# Patient Record
Sex: FEMALE | Race: WHITE | NOT HISPANIC OR LATINO | Employment: OTHER | URBAN - NONMETROPOLITAN AREA
[De-identification: names, ages, dates, MRNs, and addresses within clinical notes are randomized per-mention and may not be internally consistent; named-entity substitution may affect disease eponyms.]

---

## 2022-04-08 ENCOUNTER — TRANSCRIBE ORDERS (OUTPATIENT)
Dept: PHYSICAL THERAPY | Facility: HOSPITAL | Age: 82
End: 2022-04-08

## 2022-04-08 DIAGNOSIS — H81.12 BENIGN PAROXYSMAL VERTIGO OF LEFT EAR: Primary | ICD-10-CM

## 2022-04-14 ENCOUNTER — HOSPITAL ENCOUNTER (OUTPATIENT)
Dept: PHYSICAL THERAPY | Facility: HOSPITAL | Age: 82
Setting detail: THERAPIES SERIES
Discharge: HOME | End: 2022-04-14

## 2022-04-14 DIAGNOSIS — R42 VERTIGO: Primary | ICD-10-CM

## 2022-04-14 DIAGNOSIS — R42 DIZZINESS: ICD-10-CM

## 2022-04-14 PROCEDURE — 97110 THERAPEUTIC EXERCISES: CPT | Performed by: PHYSICAL THERAPIST

## 2022-04-14 PROCEDURE — 97162 PT EVAL MOD COMPLEX 30 MIN: CPT | Performed by: PHYSICAL THERAPIST

## 2022-04-14 NOTE — THERAPY EVALUATION
Outpatient Physical Therapy Vestibular Initial Evaluation  [facility]     Patient Name: Fatemeh Ware  : 1940  MRN: [id number]  Today's Date: 2022      Visit Date: 2022    Patient seen for 1 PT sessions.  Patient reports N/A% of improvement.  Next MD appt: 2022.  Recertification: 2022.    Therapy Diagnosis: Dizziness         Past Medical History:   Diagnosis Date   • Anxiety    • Arthritis    • Elevated cholesterol    • History of transfusion    • Hypertension    • Sleep apnea         Past Surgical History:   Procedure Laterality Date   • EYE SURGERY     • HYSTERECTOMY     • JOINT REPLACEMENT     • TONSILLECTOMY           Visit Dx:     ICD-10-CM ICD-9-CM   1. Vertigo  R42 780.4   2. Dizziness  R42 780.4        Patient History     Row Name 22             History    Chief Complaint Dizziness  -AJ      Date Current Problem(s) Began --  Chronic, 6 months  -AJ      Brief Description of Current Complaint Patient reports she has had issues with ear infections and some dizziness. She reprots she has not been seen by an ENT. She reprots PT is the first place they have sent her.  -AJ      Previous treatment for THIS PROBLEM Medication  -AJ      Current Tobacco Use None  -AJ      Smoking Status Former smoker  -AJ      Patient's Rating of General Health Fair  -AJ      Hand Dominance right-handed  -AJ      Occupation/sports/leisure activities Occupation: unemployed; Hobbies: sewing, knitting, cross stitch, cooking, travel  -AJ      Patient seeing anyone else for problem(s)? PCP  -AJ      What clinical tests have you had for this problem? --  None  -AJ              Pain     Pain at Present 0  -AJ      Pain at Best 0  -AJ      Pain at Worst 0  -AJ            User Key  (r) = Recorded By, (t) = Taken By, (c) = Cosigned By    Initials Name Provider Type    Carline Day, PT DPT Physical Therapist                 Vestibular Rohit     Row Name 22 0800              Occulomotor Exam Fixation Present    Occular ROM Normal  -AJ      Spontaneous Nystagmus Absent  -AJ      Gaze-induced Nystagmus Absent  -AJ      Head Shaking Horizontal Absent  -AJ      Head Shaking Vertical Absent  -AJ      Smooth Pursuit Normal  -AJ      Convergence Normal  -              Vestibulo-Occular Reflex (VOR)    VOR 1 Head Only Normal  -AJ      VOR to Slow Head Movement Normal  -AJ      VOR to Fast Head Movement/Head Thrust Test Normal  -AJ              VOR with Occulomotor Exam Fixation Absent     Spontaneous Nystagmus Absent  -AJ      Gaze-Induced Nystagmus Absent  -AJ      Head-Shaking Nystagmus Horizontal Absent  -AJ      Head-Shaking Nystagmus Vertical Absent  -AJ              Positional Testing    Vertebrobasilar Artery Screen - Right Negative  -AJ      Vertebrobasilar Artery Screen - Left Negative  -AJ      Marshall-Hallpike Right No nystagmus  -AJ      Kim-Hallpike Left No nystagmus  -AJ      Horizontal Roll Test Right No nystagmus  -      Horizontal Roll Test Left No nystagmus  -            User Key  (r) = Recorded By, (t) = Taken By, (c) = Cosigned By    Initials Name Provider Type    AJ Carline Garcias, PT DPT Physical Therapist               PT Ortho     Row Name 04/14/22 0800       Subjective Comments    Subjective Comments see history  -       Precautions and Contraindications    Precautions/Limitations no known precautions/limitations  -       Subjective Pain    Able to rate subjective pain? yes  -AJ    Pre-Treatment Pain Level 0  -    Post-Treatment Pain Level 0  -       Posture/Observations    Alignment Options Forward head;Cervical lordosis;Thoracic kyphosis  -AJ    Forward Head Mild;Moderate;Increased  -AJ    Cervical Lordosis Mild;Moderate;Increased  -AJ    Thoracic Kyphosis Mild;Moderate;Increased  -    Posture/Observations Comments No distress, poor overall postural awareness.  -AJ       Cervical/Thoracic Special Tests    Spurlings (Foraminal Compression)  Bilateral:;Negative  -AJ    Cervical Compression (Forarminal Compression vs. Facet Pain) Bilateral:;Negative  -AJ    Cervical Distraction (Foraminal Compression vs. Facet Pain) Bilateral:;Negative  -AJ    Vertebral Artery Test (VBI Sign) Bilateral:;Negative  -AJ       Head/Neck/Trunk    Neck Extension AROM 45°  -AJ    Neck Flexion AROM 42°  -AJ    Neck Lt Lateral Flexion AROM 25°  -AJ    Neck Rt Lateral Flexion AROM 25°  -AJ    Neck Lt Rotation AROM 50°  -AJ    Neck Rt Rotation AROM 62°  -AJ       MMT (Manual Muscle Testing)    General MMT Comments WFL c-spine and B UEs  -AJ       Sensation    Sensation WNL? WNL  -AJ    Light Touch No apparent deficits  -AJ    Additional Comments Denies any numbness or tingling.  -AJ       Pathomechanics    Spine Pathomechanics Limited upper thoracic motion with cervical ROM  -       Transfers    Comment, (Transfers) I with all transafers.  -       Gait/Stairs (Locomotion)    Comment, (Gait/Stairs) FWB, non-antalgic gait, no assistive device, no significant deviations noted, normal arm swing with gait.  -          User Key  (r) = Recorded By, (t) = Taken By, (c) = Cosigned By    Initials Name Provider Type    Carline Day, PT DPT Physical Therapist                          Therapy Education  Education Details: HEP: all exercises given today  Given: HEP, Symptoms/condition management, Fall prevention and home safety (POC)  Program: New  How Provided: Verbal, Demonstration, Written  Provided to: Patient  Level of Understanding: Verbalized, Demonstrated       OP Exercises     Row Name 04/14/22 0800             Subjective Comments    Subjective Comments see history  -              Subjective Pain    Able to rate subjective pain? yes  -      Pre-Treatment Pain Level 0  -      Post-Treatment Pain Level 0  -AJ              Exercise 1    Exercise Name 1 Pro II UE F/R- eyes scanning the room  -      Time 1 8 minutes  -      Additional Comments L 2.0  -AJ               Exercise 2    Exercise Name 2 B UT S  -AJ      Reps 2 2  -AJ      Time 2 30 seconds  -AJ              Exercise 3    Exercise Name 3 B LS S  -AJ      Reps 3 2  -AJ      Time 3 30 seconds  -AJ              Exercise 4    Exercise Name 4 AROM c-spine 6-way with eyes fixed on stationary object  -      Reps 4 10 each  -              Exercise 5    Exercise Name 5 B SCM S  -AJ      Reps 5 2  -AJ      Time 5 30 seconds  -            User Key  (r) = Recorded By, (t) = Taken By, (c) = Cosigned By    Initials Name Provider Type    Carline Day, PT DPT Physical Therapist                             PT OP Goals     Row Name 04/14/22 0800          PT Short Term Goals    STG 1 I with HEP and have additions/changes by next re-certification  -     STG 2 Patient able to roll from side to side with no onset of s/s.  -     STG 3 Patient able to perform 10 sit to/from stand with exaggerate head motions with no onset of s/s.  -     STG 4 AROM B cervical SB >= 30°.  -     STG 5 AROM B cervical ROT >=70°.  -            Long Term Goals    LTG 1 AROM for c-spine all WNL< no increase in s/s.  -     LTG 2 Patient to report s/s have resolved completely.  -     LTG 3 Patient to be I with self-management of s/s.  -     LTG 4 I with final HEP.  -            Time Calculation    PT Goal Re-Cert Due Date 05/05/22  -           User Key  (r) = Recorded By, (t) = Taken By, (c) = Cosigned By    Initials Name Provider Type    Carline Day, PT DPT Physical Therapist              Barriers to Rehab: Include significant or possible arthritic/degenerative changes that have occurred within the joint/spine, The chronicity of this issue.    Safety Issues: None noted.        PT Assessment/Plan     Row Name 04/14/22 0800          PT Assessment    Functional Limitations Performance in leisure activities;Limitation in home management  -AALIYAH     Impairments Joint integrity;Joint mobility;Posture;Range of motion;Balance  " -AJ     Assessment Comments Patient is an 82yo female with complaints of dizziness and \"junk in her R ear\". She reports dizziness is random and also has \"funny feeling in the head\". No falls but lives alone. She has limitatoins in cervical ROM and flexibiliyt, but all vestibular tests are negative. We will work on comnpensation techniques and coping mechanisms for dizziness as could be from ear drainage or side effects from medications. We will also address flexibility and mobiltiy as well as balance. Skilled PT with address deficits listed.    Patient did well with all HEP exercises and written copies were provided to the patient.  -AJ     Please refer to paper survey for additional self-reported information Yes  -AJ     Rehab Potential Good  -AJ     Patient/caregiver participated in establishment of treatment plan and goals Yes  -AJ     Patient would benefit from skilled therapy intervention Yes  -AJ            PT Plan    PT Frequency 2x/week  -AJ     Predicted Duration of Therapy Intervention (PT) 4-8 visits  -AJ     Planned CPT's? PT EVAL MOD COMPLELITY: 94474;PT RE-EVAL: 86259;PT THER PROC EA 15 MIN: 76360;PT THER ACT EA 15 MIN: 57883;PT MANUAL THERAPY EA 15 MIN: 82486;PT NEUROMUSC RE-EDUCATION EA 15 MIN: 99186;PT GAIT TRAINING EA 15 MIN: 74942;PT SELF CARE/HOME MGMT/TRAIN EA 15: 92080;PT CANALITH REPOSITIONIN;PT THER SUPP EA 15 MIN  -AJ     PT Plan Comments Progress overall ROM, flexibility, safety, balance, and s/s management.  -AAILYAH           User Key  (r) = Recorded By, (t) = Taken By, (c) = Cosigned By    Initials Name Provider Type    Carline Day, PT DPT Physical Therapist             Other therapeutic activities and/or exercises will be prescribed depending on the patient's progress or lack thereof.      Outcome Measure Options: Dizziness Handicap Inventory (Physical: 0, Emotinal :12, Functional: 8; TOTAL 20, mild)         Time Calculation:   Start Time: 819  Stop Time: 915  Time " Calculation (min): 56 min  Total Timed Code Minutes- PT: 26 minute(s)   Therapy Charges for Today     Code Description Service Date Service Provider Modifiers Qty    43586356372 HC PT EVAL MOD COMPLEXITY 2 4/14/2022 Carline Garcias, PT DPT GP 1    66143494044 HC PT THER SUPP EA 15 MIN 4/14/2022 Carline Garcias, PT DPT GP 1    71100165013 HC PT THER PROC EA 15 MIN 4/14/2022 Carline Garcias, PT DPT GP 2          PT G-Codes  Outcome Measure Options: Dizziness Handicap Inventory (Physical: 0, Emotinal :12, Functional: 8; TOTAL 20, mild)         This document has been electronically signed by Carline Garcias, PT DPT, CSCS on April 14, 2022 10:39 CDT

## 2022-04-18 ENCOUNTER — HOSPITAL ENCOUNTER (OUTPATIENT)
Dept: PHYSICAL THERAPY | Facility: HOSPITAL | Age: 82
Setting detail: THERAPIES SERIES
Discharge: HOME | End: 2022-04-18

## 2022-04-18 DIAGNOSIS — R42 VERTIGO: Primary | ICD-10-CM

## 2022-04-18 DIAGNOSIS — R42 DIZZINESS: ICD-10-CM

## 2022-04-18 PROCEDURE — 97112 NEUROMUSCULAR REEDUCATION: CPT

## 2022-04-18 NOTE — THERAPY TREATMENT NOTE
Outpatient Physical Therapy Ortho Treatment Note  [facility]     Patient Name: Fatemeh Ware  : 1940  MRN: [id number]  Today's Date: 2022      Visit Date: 2022     Patient has attended 2 visits  N/A% Improvement  MD Visit:   Recheck Date: 2022    Therapy Diagnosis: Dizziness      Visit Dx:    ICD-10-CM ICD-9-CM   1. Vertigo  R42 780.4   2. Dizziness  R42 780.4       There is no problem list on file for this patient.       Past Medical History:   Diagnosis Date   • Anxiety    • Arthritis    • Elevated cholesterol    • History of transfusion    • Hypertension    • Sleep apnea         Past Surgical History:   Procedure Laterality Date   • EYE SURGERY     • HYSTERECTOMY     • JOINT REPLACEMENT     • TONSILLECTOMY          PT Ortho     Row Name 22 1500       Precautions and Contraindications    Precautions/Limitations no known precautions/limitations  -       Subjective Pain    Pre-Treatment Pain Level 0  denies pain and dizziness currently  -    Post-Treatment Pain Level 0  no dizziness, reports only her normal arthritic stuff as far as pain goes  -          User Key  (r) = Recorded By, (t) = Taken By, (c) = Cosigned By    Initials Name Provider Type    Jody Ledesma, PTA Physical Therapist Assistant                             PT Assessment/Plan     Row Name 22 1500          PT Assessment    Assessment Comments patient requires max cueing to complete all therex appropriately today. Discussed the need to slow transitions down (sit to stand to walking) and pause between each in order to be safer and reduce symptoms. the only time symptoms are illicited today was during a sit to stand transfer. no other symptoms noted during treatment and patient reports feeling better before she leaves.  -            PT Plan    PT Frequency 2x/week  -     PT Plan Comments sit to/from stand with head movement  -           User Key  (r) = Recorded By, (t) = Taken By, (c) =  Cosigned By    Initials Name Provider Type     Jody Lopez, PRAVEEN Physical Therapist Assistant                   OP Exercises     Row Name 04/18/22 1500             Subjective Comments    Subjective Comments states that she is doing her exercises and that they make her dizzy. states that they all make her dizzy vs give her relieve of symptoms. states that she has to try therapy. as treatment goes on, she goes on to say that saturday she even got dizzy even when she went to get a massage saturday. states that she was so dizzy after her massage that she didn't even do her exercises on saturday. post Pro II bike today patient is asked if that made her dizzy and patient states that she wont know until she stands up.  -              Subjective Pain    Able to rate subjective pain? yes  -      Pre-Treatment Pain Level 0  denies pain and dizziness currently  -      Post-Treatment Pain Level 0  no dizziness, reports only her normal arthritic stuff as far as pain goes  -              Exercise 1    Exercise Name 1 Pro II UE Fwd/Retro Fixed Gaze Cspine Flex/Ext and Cspine Rotation and No Cspine ROM alt through  -      Cueing 1 Verbal;Demo  -      Time 1 10 minutes  -      Additional Comments L 4.0  -              Exercise 2    Exercise Name 2 B UT S  -      Cueing 2 Verbal;Demo  -      Reps 2 2  -      Time 2 30 sec hold  -              Exercise 3    Exercise Name 3 B Levator S  -      Cueing 3 Verbal;Demo  -      Reps 3 2  -      Time 3 30 sec hold  -              Exercise 4    Exercise Name 4 B SCM S  -      Cueing 4 Verbal;Tactile;Demo  -      Reps 4 2  -      Time 4 30 sec hold  -              Exercise 5    Exercise Name 5 AROM Cspine 6 way with Fixed Gaze  -      Cueing 5 Verbal;Demo  -      Reps 5 15 each  -              Exercise 6    Exercise Name 6 sit to stand to walk to sit  -            User Key  (r) = Recorded By, (t) = Taken By, (c) = Cosigned By    Initials Name  Provider Type     Jody Lopez PTA Physical Therapist Assistant                              PT OP Goals     Row Name 04/18/22 1500          PT Short Term Goals    STG 1 I with HEP and have additions/changes by next re-certification  -     STG 1 Progress Partially Met  -     STG 2 Patient able to roll from side to side with no onset of s/s.  -     STG 3 Patient able to perform 10 sit to/from stand with exaggerate head motions with no onset of s/s.  -     STG 4 AROM B cervical SB >= 30°.  -     STG 5 AROM B cervical ROT >=70°.  -            Long Term Goals    LTG 1 AROM for c-spine all WNL< no increase in s/s.  -     LTG 2 Patient to report s/s have resolved completely.  -     LTG 3 Patient to be I with self-management of s/s.  -     LTG 4 I with final HEP.  -            Time Calculation    PT Goal Re-Cert Due Date 05/05/22  -           User Key  (r) = Recorded By, (t) = Taken By, (c) = Cosigned By    Initials Name Provider Type     Jody Lopze PTA Physical Therapist Assistant                Therapy Education  Given: HEP, Symptoms/condition management, Fall prevention and home safety  Program: Reinforced  How Provided: Verbal, Demonstration  Provided to: Patient  Level of Understanding: Teach back education performed, Verbalized, Demonstrated              Time Calculation:   Start Time: 1513  Stop Time: 1606  Time Calculation (min): 53 min  Total Timed Code Minutes- PT: 53 minute(s)  Therapy Charges for Today     Code Description Service Date Service Provider Modifiers Qty    75485406885 HC PT NEUROMUSC RE EDUCATION EA 15 MIN 4/18/2022 Jody Lopez PTA GP, CQ 4                    Jody Lopez PTA  4/18/2022       This document has been electronically signed by Jody Lopez PTA on April 18, 2022 16:15 CDT

## 2022-04-20 ENCOUNTER — HOSPITAL ENCOUNTER (OUTPATIENT)
Dept: PHYSICAL THERAPY | Facility: HOSPITAL | Age: 82
Setting detail: THERAPIES SERIES
Discharge: HOME | End: 2022-04-20

## 2022-04-20 DIAGNOSIS — R42 VERTIGO: Primary | ICD-10-CM

## 2022-04-20 DIAGNOSIS — R42 DIZZINESS: ICD-10-CM

## 2022-04-20 PROCEDURE — 97112 NEUROMUSCULAR REEDUCATION: CPT | Performed by: PHYSICAL THERAPIST

## 2022-04-20 PROCEDURE — 97110 THERAPEUTIC EXERCISES: CPT | Performed by: PHYSICAL THERAPIST

## 2022-04-20 NOTE — THERAPY TREATMENT NOTE
Outpatient Physical Therapy Vestibular Treatment Note  [facility]     Patient Name: Fatemeh Ware  : 1940  MRN: [id number]  Today's Date: 2022      Visit Date: 2022    Patient seen for 3 PT sessions.  Patient reports 0% of improvement.  Next MD appt: 2022.  Recertification: 2022.    Therapy Diagnosis: Dizziness        Visit Dx:     ICD-10-CM ICD-9-CM   1. Vertigo  R42 780.4   2. Dizziness  R42 780.4            PT Ortho     Row Name 22 09       Subjective Comments    Subjective Comments Patient continues to report she has dizziness on/off with no known aggrivating factors. She reports she still has trouble remembering to go slow also.  -AALIYAH       Precautions and Contraindications    Precautions/Limitations no known precautions/limitations  -AALIYAH          User Key  (r) = Recorded By, (t) = Taken By, (c) = Cosigned By    Initials Name Provider Type    Carline Day, PT DPT Physical Therapist                           PT Assessment/Plan     Row Name 22          PT Assessment    Assessment Comments Patient did well with new ther ex. Able to complete al lther ex today with no complaints of dizziness and no onset of s/s.  -AALIYAH            PT Plan    PT Frequency 2x/week  -     PT Plan Comments Add rolling next session.  -           User Key  (r) = Recorded By, (t) = Taken By, (c) = Cosigned By    Initials Name Provider Type    Carline Day, PT DPT Physical Therapist                    OP Exercises     Row Name 22             Subjective Comments    Subjective Comments Patient continues to report she has dizziness on/off with no known aggrivating factors. She reports she still has trouble remembering to go slow also.  -AALIYAH              Subjective Pain    Able to rate subjective pain? yes  -AALIYAH      Pre-Treatment Pain Level 0  -AALIYAH              Exercise 1    Exercise Name 1 Pro II UE Fwd/Retro Fixed Gaze Cspine Flex/Ext and Cspine Rotation  "and No Cspine ROM alt through  -AJ      Cueing 1 Verbal;Demo  -AJ      Time 1 10 minutes  -AJ      Additional Comments L 4.0  -AJ              Exercise 2    Exercise Name 2 B UT S  -AJ      Reps 2 2  -AJ      Time 2 30 sec hold  -AJ              Exercise 3    Exercise Name 3 B Levator S  -AJ      Reps 3 2  -AJ      Time 3 30 sec hold  -AJ              Exercise 4    Exercise Name 4 B SCM S  -AJ      Reps 4 2  -AJ      Time 4 30 sec hold  -AJ              Exercise 5    Exercise Name 5 sit to/from stand for exaggerated head motions  -AJ      Reps 5 10  -AJ              Exercise 6    Exercise Name 6 AROM Cspine 6 way with Fixed Gaze  -AJ      Reps 6 15 each  -AJ              Exercise 7    Exercise Name 7 AROM c-spine wandering gaze  -AJ      Reps 7 15 each  -AJ              Exercise 8    Exercise Name 8 Scap squeezes  -AJ      Reps 8 20  -AJ      Time 8 5\" hold  -AJ            User Key  (r) = Recorded By, (t) = Taken By, (c) = Cosigned By    Initials Name Provider Type    Carline Day, PT DPT Physical Therapist                             PT OP Goals     Row Name 04/20/22 0900          PT Short Term Goals    STG 1 I with HEP and have additions/changes by next re-certification  -     STG 1 Progress Partially Met  -     STG 2 Patient able to roll from side to side with no onset of s/s.  -     STG 3 Patient able to perform 10 sit to/from stand with exaggerate head motions with no onset of s/s.  -     STG 3 Progress Met;Ongoing  -     STG 4 AROM B cervical SB >= 30°.  -     STG 5 AROM B cervical ROT >=70°.  -            Long Term Goals    LTG 1 AROM for c-spine all WNL< no increase in s/s.  -     LTG 2 Patient to report s/s have resolved completely.  -     LTG 3 Patient to be I with self-management of s/s.  -     LTG 4 I with final HEP.  -            Time Calculation    PT Goal Re-Cert Due Date 05/05/22  -           User Key  (r) = Recorded By, (t) = Taken By, (c) = Cosigned By    " Initials Name Provider Type     Carline Garcias, PT DPT Physical Therapist                Therapy Education  Given: HEP, Symptoms/condition management, Fall prevention and home safety  Program: Reinforced  How Provided: Verbal, Demonstration  Provided to: Patient  Level of Understanding: Teach back education performed, Verbalized, Demonstrated              Time Calculation:   Start Time: 0910  Stop Time: 0949  Time Calculation (min): 39 min  Total Timed Code Minutes- PT: 39 minute(s)   Therapy Charges for Today     Code Description Service Date Service Provider Modifiers Qty    14579508473 HC PT THER SUPP EA 15 MIN 4/20/2022 Carline Garcias, PT DPT GP 1    [id number]  PT NEUROMUSC RE EDUCATION EA 15 MIN 4/20/2022 Carline Garcias, PT DPT GP 2    [id number]  PT THER PROC EA 15 MIN 4/20/2022 Carline Garcias, PT DPT GP 1                 This document has been electronically signed by Carline Garcias PT DPT, [facility] on April 20, 2022 09:49 CDT

## 2022-04-25 ENCOUNTER — HOSPITAL ENCOUNTER (OUTPATIENT)
Dept: PHYSICAL THERAPY | Facility: HOSPITAL | Age: 82
Setting detail: THERAPIES SERIES
Discharge: HOME | End: 2022-04-25

## 2022-04-25 DIAGNOSIS — R42 VERTIGO: Primary | ICD-10-CM

## 2022-04-25 DIAGNOSIS — R42 DIZZINESS: ICD-10-CM

## 2022-04-25 PROCEDURE — 97110 THERAPEUTIC EXERCISES: CPT

## 2022-04-25 PROCEDURE — 97112 NEUROMUSCULAR REEDUCATION: CPT

## 2022-04-25 NOTE — THERAPY TREATMENT NOTE
"    Outpatient Physical Therapy Ortho Treatment Note  [facility]     Patient Name: Fatemeh Ware  : 1940  MRN: [id number]  Today's Date: 2022     Pt seen for 4 PT sessions  Reported Improvement:  \"a little bit\"  %  MD Visit: 2022  Recheck Date: 2022    Therapy Diagnosis:  Dizziness        Visit Date: 2022    Visit Dx:    ICD-10-CM ICD-9-CM   1. Vertigo  R42 780.4   2. Dizziness  R42 780.4       There is no problem list on file for this patient.       Past Medical History:   Diagnosis Date   • Anxiety    • Arthritis    • Elevated cholesterol    • History of transfusion    • Hypertension    • Sleep apnea         Past Surgical History:   Procedure Laterality Date   • EYE SURGERY     • HYSTERECTOMY     • JOINT REPLACEMENT     • TONSILLECTOMY          PT Ortho     Row Name 22       Subjective Comments    Subjective Comments Pt reports dizziness off and on.  No known cause or position of s/s  -JW       Precautions and Contraindications    Precautions/Limitations no known precautions/limitations  -       Subjective Pain    Able to rate subjective pain? yes  -    Pre-Treatment Pain Level 0  -          User Key  (r) = Recorded By, (t) = Taken By, (c) = Cosigned By    Initials Name Provider Type    Hamida Duran PTA Physical Therapist Assistant                             PT Assessment/Plan     Row Name 22          PT Assessment    Assessment Comments Improved carryover of HEP with UE stretches. Pt reports improved compliance with HEP in general but did not get them done yesterday.  Reports s/s with fixed gaze AROM stating it started a little before that.  Pt with occasional reports of feeling like \"drunken \" more than dizzines.  -            PT Plan    PT Frequency 2x/week  -     PT Plan Comments progress rolling  -           User Key  (r) = Recorded By, (t) = Taken By, (c) = Cosigned By    Initials Name Provider Type    Hamida Duran PTA " "Physical Therapist Assistant                   OP Exercises     Row Name 04/25/22 0900             Subjective Comments    Subjective Comments Pt reports dizziness off and on.  No known cause or position of s/s  -JW              Subjective Pain    Able to rate subjective pain? yes  -JW      Pre-Treatment Pain Level 0  -JW              Exercise 1    Exercise Name 1 Pro II UE Fwd/Retro Fixed Gaze Cspine Flex/Ext and Cspine Rotation and No Cspine ROM alt through  -JW      Time 1 10 minutes  -JW      Additional Comments L 4.0  -JW              Exercise 2    Exercise Name 2 B UT S  -JW      Reps 2 2  -JW      Time 2 30 sec hold  -JW              Exercise 3    Exercise Name 3 B Levator S  -JW      Reps 3 2  -JW      Time 3 30 sec hold  -JW              Exercise 4    Exercise Name 4 B SCM S  -JW      Reps 4 2  -JW      Time 4 30 sec hold  -JW              Exercise 5    Exercise Name 5 scap squeezes  -JW      Reps 5 20  -JW      Time 5 5\" hold  -JW              Exercise 6    Exercise Name 6 AROM Cspine 6 way with Fixed Gaze  -JW      Reps 6 20  -JW      Time 6 ea direction  -JW              Exercise 7    Exercise Name 7 AROM c-spine wandering gaze  -JW      Reps 7 20  -JW              Exercise 8    Exercise Name 8 Sidelying to sitting  -JW      Reps 8 5 reps ea side  -JW              Exercise 9    Exercise Name 9 Rolling Left  -JW      Reps 9 5 reps  -JW              Exercise 10    Exercise Name 10 Rolling right  -JW      Sets 10 5 reps  -JW              Exercise 11    Exercise Name 11 sit to stands  -JW      Sets 11 exaggerated head motions  -JW      Reps 11 10  -JW            User Key  (r) = Recorded By, (t) = Taken By, (c) = Cosigned By    Initials Name Provider Type    Hamida Duran PTA Physical Therapist Assistant                              PT OP Goals     Row Name 04/25/22 0900          PT Short Term Goals    STG 1 I with HEP and have additions/changes by next re-certification  -JW     STG 1 Progress Partially " Met  -     STG 2 Patient able to roll from side to side with no onset of s/s.  -     STG 3 Patient able to perform 10 sit to/from stand with exaggerate head motions with no onset of s/s.  -     STG 3 Progress Met;Ongoing  -     STG 4 AROM B cervical SB >= 30°.  -     STG 5 AROM B cervical ROT >=70°.  -            Long Term Goals    LTG 1 AROM for c-spine all WNL< no increase in s/s.  -     LTG 2 Patient to report s/s have resolved completely.  -     LTG 3 Patient to be I with self-management of s/s.  -     LTG 4 I with final HEP.  -            Time Calculation    PT Goal Re-Cert Due Date 05/05/22  -           User Key  (r) = Recorded By, (t) = Taken By, (c) = Cosigned By    Initials Name Provider Type    Hamida Duran PTA Physical Therapist Assistant                Therapy Education  Given: HEP, Symptoms/condition management, Fall prevention and home safety  Program: Reinforced  How Provided: Verbal, Demonstration  Provided to: Patient  Level of Understanding: Teach back education performed, Verbalized, Demonstrated              Time Calculation:   Start Time: 0915  Therapy Charges for Today     Code Description Service Date Service Provider Modifiers Qty    79989308928 HC PT THER PROC EA 15 MIN 4/25/2022 Hamida Falk PTA GP, CQ 2    45466385428 HC PT NEUROMUSC RE EDUCATION EA 15 MIN 4/25/2022 Hamida Falk PTA GP, CQ 2    68357641521 HC PT THER SUPP EA 15 MIN 4/25/2022 Hamida Falk PTA GP, CQ 1                    Hamida Falk PTA  4/25/2022

## 2022-04-27 ENCOUNTER — HOSPITAL ENCOUNTER (OUTPATIENT)
Dept: PHYSICAL THERAPY | Facility: HOSPITAL | Age: 82
Setting detail: THERAPIES SERIES
Discharge: HOME | End: 2022-04-27

## 2022-04-27 DIAGNOSIS — R42 DIZZINESS: ICD-10-CM

## 2022-04-27 DIAGNOSIS — R42 VERTIGO: Primary | ICD-10-CM

## 2022-04-27 PROCEDURE — 97110 THERAPEUTIC EXERCISES: CPT

## 2022-04-27 NOTE — THERAPY TREATMENT NOTE
Outpatient Physical Therapy Ortho Treatment Note  [facility]     Patient Name: Fatemeh Ware  : 1940  MRN: [id number]  Today's Date: 2022     Pt seen for 5 PT sessions  Reported Improvement:  70-80 %  MD Visit: 2022  Recheck Date: 2022    Therapy Diagnosis:  Dizziness       Visit Date: 2022    Visit Dx:    ICD-10-CM ICD-9-CM   1. Vertigo  R42 780.4   2. Dizziness  R42 780.4       There is no problem list on file for this patient.       Past Medical History:   Diagnosis Date   • Anxiety    • Arthritis    • Elevated cholesterol    • History of transfusion    • Hypertension    • Sleep apnea         Past Surgical History:   Procedure Laterality Date   • EYE SURGERY     • HYSTERECTOMY     • JOINT REPLACEMENT     • TONSILLECTOMY          PT Ortho     Row Name 22 1300       Subjective Pain    Able to rate subjective pain? yes  -    Pre-Treatment Pain Level 0  -          User Key  (r) = Recorded By, (t) = Taken By, (c) = Cosigned By    Initials Name Provider Type    Hamida Duran PTA Physical Therapist Assistant                             PT Assessment/Plan     Row Name 22 1400          PT Assessment    Assessment Comments Good repsonse with overall PT tx so far.  Pt reports no s/s at all this date and overall feeling better.  discussed posture with gait and the need to work on picking up her feet more when walking with an upright posture versus flexed forward and forward head.  Pt instructed in seated HR/TR for home to improve LE strength and endurance with gait and shuffling.  Pt demonstrates improved carryover and recall of current HEP  -JW            PT Plan    PT Frequency 2x/week  -     PT Plan Comments next visit PT recheck  -           User Key  (r) = Recorded By, (t) = Taken By, (c) = Cosigned By    Initials Name Provider Type    Hamida Duran PTA Physical Therapist Assistant                   OP Exercises     Row Name 22 1300              Subjective Comments    Subjective Comments Getting better - no dizziness at all today  -              Subjective Pain    Able to rate subjective pain? yes  -JW      Pre-Treatment Pain Level 0  -JW      Post-Treatment Pain Level 0  -JW              Exercise 1    Exercise Name 1 Pro II UE Fwd/Retro Fixed Gaze Cspine Flex/Ext and Cspine Rotation and No Cspine ROM alt through  -JW      Time 1 10 minutes  -JW      Additional Comments L 4.5  -JW              Exercise 2    Exercise Name 2 B UT S  -JW      Reps 2 2  -JW      Time 2 30 sec hold  -JW              Exercise 3    Exercise Name 3 B Levator S  -JW      Reps 3 2  -JW      Time 3 30 sec hold  -JW              Exercise 4    Exercise Name 4 B SCM S  -JW      Reps 4 2  -JW      Time 4 30 sec hold  -JW              Exercise 5    Exercise Name 5 Sit to/from stands  -JW      Sets 5 2  -JW      Reps 5 10  -JW              Exercise 6    Exercise Name 6 Seated toe taps / heel raises  -JW      Sets 6 2  -JW      Reps 6 1 min  -JW              Exercise 7    Exercise Name 7 Verbal reveiw of HEP  -            User Key  (r) = Recorded By, (t) = Taken By, (c) = Cosigned By    Initials Name Provider Type    Hamida Duran, PTA Physical Therapist Assistant                              PT OP Goals     Row Name 04/27/22 1300          PT Short Term Goals    STG 1 I with HEP and have additions/changes by next re-certification  -     STG 1 Progress Partially Met  -     STG 2 Patient able to roll from side to side with no onset of s/s.  -     STG 2 Progress Ongoing  -     STG 3 Patient able to perform 10 sit to/from stand with exaggerate head motions with no onset of s/s.  -     STG 3 Progress Met;Ongoing  -     STG 4 AROM B cervical SB >= 30°.  -     STG 5 AROM B cervical ROT >=70°.  -            Long Term Goals    LTG 1 AROM for c-spine all WNL< no increase in s/s.  -     LTG 2 Patient to report s/s have resolved completely.  -     LTG 3 Patient to be I with  self-management of s/s.  -ASHLIE     LTG 4 I with final HEP.  -ASHLIE            Time Calculation    PT Goal Re-Cert Due Date 05/05/22  -ASHLIE           User Key  (r) = Recorded By, (t) = Taken By, (c) = Cosigned By    Initials Name Provider Type    Hamida Duran PTA Physical Therapist Assistant                Therapy Education  Education Details: seated TR/HR, sit to/from stands  Given: HEP, Symptoms/condition management, Posture/body mechanics  Program: Reinforced  How Provided: Verbal, Demonstration  Provided to: Patient  Level of Understanding: Teach back education performed, Verbalized, Demonstrated              Time Calculation:   Start Time: 1300  Stop Time: 1346  Time Calculation (min): 46 min  Therapy Charges for Today     Code Description Service Date Service Provider Modifiers Qty    68111040536  PT THER PROC EA 15 MIN 4/27/2022 Hamida Falk PTA GP, CQ 3    20121071257 HC PT THER SUPP EA 15 MIN 4/27/2022 Hamida Falk PTA GP, CQ 1                    Hamida Falk PTA  4/27/2022

## 2022-05-02 ENCOUNTER — HOSPITAL ENCOUNTER (OUTPATIENT)
Dept: PHYSICAL THERAPY | Facility: HOSPITAL | Age: 82
Setting detail: THERAPIES SERIES
Discharge: HOME | End: 2022-05-02

## 2022-05-02 DIAGNOSIS — R42 DIZZINESS: ICD-10-CM

## 2022-05-02 DIAGNOSIS — R42 VERTIGO: Primary | ICD-10-CM

## 2022-05-02 PROCEDURE — 97530 THERAPEUTIC ACTIVITIES: CPT | Performed by: PHYSICAL THERAPIST

## 2022-05-02 PROCEDURE — 97110 THERAPEUTIC EXERCISES: CPT | Performed by: PHYSICAL THERAPIST

## 2022-05-02 NOTE — THERAPY DISCHARGE NOTE
Outpatient Physical Therapy Vestibular Progress Note/Discharge Summary  [facility]     Patient Name: Fatemeh Ware  : 1940  MRN: [id number]  Today's Date: 2022      Visit Date: 2022     Patient seen for 6 PT sessions.  Patient reports 80% of improvement.  Next MD appt: 2022.  Recertification: N/A    Therapy Diagnosis: Dizziness        Visit Dx:     ICD-10-CM ICD-9-CM   1. Vertigo  R42 780.4   2. Dizziness  R42 780.4             Vestibular Eval     Row Name 22 0900             Vestibular Objective    General Observation No distress.  -AJ      Fall History None  -AJ      Use of Assistive Devices None  -AJ              Cognition    Orientation Level Oriented to place;Oriented to time;Oriented to situation;Oriented to person  -AJ              Symptom Behavior    Type of Dizziness Funny feeling in head  -AJ      Frequency of Dizziness Several Times a Day  -AJ      Duration of Dizziness Seconds  -AJ      Aggravating Factors No known aggravating factors  -AJ      Relieving Factors Head stationary  -AJ              Occulomotor Exam Fixation Present    Occular ROM Normal  -AJ      Spontaneous Nystagmus Absent  -AJ      Gaze-induced Nystagmus Absent  -AJ      Head Shaking Horizontal Absent  -AJ      Head Shaking Vertical Absent  -AJ      Smooth Pursuit Normal  -AJ      Convergence Normal  -AJ              Vestibulo-Occular Reflex (VOR)    VOR 1 Head Only Normal  -AJ      VOR to Slow Head Movement Normal  -AJ      VOR to Fast Head Movement/Head Thrust Test Normal  -AJ              VOR with Occulomotor Exam Fixation Absent     Spontaneous Nystagmus Absent  -AJ      Gaze-Induced Nystagmus Absent  -AJ      Head-Shaking Nystagmus Horizontal Absent  -AJ      Head-Shaking Nystagmus Vertical Absent  -AJ            User Key  (r) = Recorded By, (t) = Taken By, (c) = Cosigned By    Initials Name Provider Type    AJ Carline Garcias, PT DPT Physical Therapist               PT Ortho     Row Name  05/02/22 0900       Subjective Comments    Subjective Comments Patient reports she slacked on her exercises some and she can tell a difference. She rpeorts they exercises do help. SH repotrs she sees her family MD thursday to get a referral for ENT.  -AJ       Precautions and Contraindications    Precautions/Limitations no known precautions/limitations  -AJ       Subjective Pain    Able to rate subjective pain? yes  -AJ       Posture/Observations    Alignment Options Forward head;Cervical lordosis;Thoracic kyphosis  -AJ    Forward Head Mild;Moderate;Increased  -AJ    Cervical Lordosis Mild;Moderate;Increased  -AJ    Thoracic Kyphosis Mild;Moderate;Increased  -AJ    Posture/Observations Comments No distress, improved overall postursal awareness.  -AJ       Head/Neck/Trunk    Neck Extension AROM 60°  -AJ    Neck Flexion AROM 55°  -AJ    Neck Lt Lateral Flexion AROM 35°  -AJ    Neck Rt Lateral Flexion AROM 35°  -AJ    Neck Lt Rotation AROM 67°  -AJ    Neck Rt Rotation AROM 65°  -AJ       MMT (Manual Muscle Testing)    General MMT Comments WFL c-spine and B UEs  -AJ       Sensation    Sensation WNL? WNL  -AJ    Light Touch No apparent deficits  -AJ    Additional Comments Denies any numbness or tingling.  -AJ       Pathomechanics    Spine Pathomechanics Limited upper thoracic motion with cervical ROM  -AJ       Transfers    Comment, (Transfers) I with all transafers.  -AJ       Gait/Stairs (Locomotion)    Comment, (Gait/Stairs) FWB, non-antalgic gait, no assistive device, no significant deviations noted, normal arm swing with gait.  -AJ          User Key  (r) = Recorded By, (t) = Taken By, (c) = Cosigned By    Initials Name Provider Type    Carline Day, PT DPT Physical Therapist               Barriers to Rehab: Include significant or possible arthritic/degenerative changes that have occurred within the joint/spine, The chronicity of this issue.     Safety Issues: None noted.              PT Assessment/Plan      Row Name 05/02/22 0900          PT Assessment    Functional Limitations Performance in leisure activities;Limitation in home management  -AJ     Impairments Joint integrity;Posture  -AJ     Assessment Comments Patient has met most goals and I with self management. Patient also has pending referral and possible appointment with ENT.  -AJ     Please refer to paper survey for additional self-reported information No  -AJ     Rehab Potential Good  -AJ     Patient/caregiver participated in establishment of treatment plan and goals Yes  -AJ     Patient would benefit from skilled therapy intervention No  -AJ            PT Plan    PT Frequency --  N/A  -AJ     PT Plan Comments D/C today with final HEP.  -AJ           User Key  (r) = Recorded By, (t) = Taken By, (c) = Cosigned By    Initials Name Provider Type    Carline Day, PT DPT Physical Therapist                      OP Exercises     Row Name 05/02/22 0900             Subjective Comments    Subjective Comments Patient reports she slacked on her exercises some and she can tell a difference. She rpeorts they exercises do help. SH repotrs she sees her family MD thursday to get a referral for ENT.  -AJ              Subjective Pain    Able to rate subjective pain? yes  -AJ      Pre-Treatment Pain Level 0  -AJ      Post-Treatment Pain Level 0  -AJ              Exercise 1    Exercise Name 1 Pro II UE Fwd/Retro Fixed Gaze Cspine Flex/Ext and Cspine Rotation and No Cspine ROM alt through  -AJ      Time 1 10 minutes  -AJ              Exercise 2    Exercise Name 2 B UT S  -AJ      Reps 2 2  -AJ      Time 2 30 sec hold  -AJ              Exercise 3    Exercise Name 3 B Levator S  -AJ      Reps 3 2  -AJ      Time 3 30 sec hold  -AJ              Exercise 4    Exercise Name 4 B SCM S  -AJ      Reps 4 2  -AJ      Time 4 30 sec hold  -AJ              Exercise 5    Exercise Name 5 AROM Cspine 6 way with Fixed Gaze  -AJ      Reps 5 20 each  -AJ              Exercise 6     "Exercise Name 6 AROM c-spine wandering gaze  -      Reps 6 20 each  -              Exercise 7    Exercise Name 7 Scap squeezes  -      Reps 7 20  -      Time 7 5\" hold  -              Exercise 8    Exercise Name 8 Seated toe taps / heel raises  -      Reps 8 2  -      Time 8 1 minute each  -              Exercise 9    Exercise Name 9 Sit to/from stand  -      Sets 9 2  -      Reps 9 10  -AJ      Additional Comments exaggerated head motions  -              Exercise 10    Exercise Name 10 measurements  -            User Key  (r) = Recorded By, (t) = Taken By, (c) = Cosigned By    Initials Name Provider Type    Carline Day, PT DPT Physical Therapist                               PT OP Goals     Row Name 05/02/22 0900          PT Short Term Goals    STG 1 I with HEP and have additions/changes by next re-certification  -     STG 1 Progress Met  -     STG 2 Patient able to roll from side to side with no onset of s/s.  -     STG 2 Progress Partially Met  -     STG 3 Patient able to perform 10 sit to/from stand with exaggerate head motions with no onset of s/s.  -     STG 3 Progress Met  -     STG 4 AROM B cervical SB >= 30°.  -     STG 4 Progress Met  -     STG 5 AROM B cervical ROT >=70°.  -     STG 5 Progress Met  -     STG 5 Progress Comments Met within margin of error.  -            Long Term Goals    LTG 1 AROM for c-spine all WNL< no increase in s/s.  -     LTG 1 Progress Met  -     LTG 2 Patient to report s/s have resolved completely.  -     LTG 2 Progress Partially Met  -     LTG 3 Patient to be I with self-management of s/s.  -     LTG 3 Progress Met  -     LTG 4 I with final HEP.  -     LTG 4 Progress Met  -            Time Calculation    PT Goal Re-Cert Due Date --  N/A  -           User Key  (r) = Recorded By, (t) = Taken By, (c) = Cosigned By    Initials Name Provider Type    Carline Day, PT DPT Physical Therapist       "          Therapy Education  Given: HEP, Symptoms/condition management, Posture/body mechanics  Program: Reinforced  How Provided: Verbal, Demonstration  Provided to: Patient  Level of Understanding: Teach back education performed, Verbalized, Demonstrated    Outcome Measure Options: Dizziness Handicap Inventory         Time Calculation:   Start Time: 0911  Stop Time: 1005  Time Calculation (min): 54 min  Total Timed Code Minutes- PT: 54 minute(s)     Therapy Charges for Today     Code Description Service Date Service Provider Modifiers Qty    36231890605 HC PT THER SUPP EA 15 MIN 5/2/2022 Carline Garcias, PT DPT GP 1    [id number]  PT THERAPEUTIC ACT EA 15 MIN 5/2/2022 Carline Garcias, PT DPT GP 1    [id number]  PT THER PROC EA 15 MIN 5/2/2022 Carline Garcias, PT DPT GP 3          PT G-Codes  Outcome Measure Options: Dizziness Handicap Inventory     OP PT Discharge Summary  Date of Discharge: 05/02/22  Reason for Discharge: Independent  Outcomes Achieved: Refer to plan of care for updates on goals achieved, Patient able to partially acheive established goals  Discharge Destination: Home with home program        This document has been electronically signed by Carline Garcias, PT DPT, CSCS on May 2, 2022 10:12 CDT

## 2022-05-04 ENCOUNTER — APPOINTMENT (OUTPATIENT)
Dept: PHYSICAL THERAPY | Facility: HOSPITAL | Age: 82
End: 2022-05-04

## 2022-05-09 ENCOUNTER — APPOINTMENT (OUTPATIENT)
Dept: PHYSICAL THERAPY | Facility: HOSPITAL | Age: 82
End: 2022-05-09

## 2022-05-11 ENCOUNTER — APPOINTMENT (OUTPATIENT)
Dept: PHYSICAL THERAPY | Facility: HOSPITAL | Age: 82
End: 2022-05-11

## 2022-11-14 ENCOUNTER — TRANSCRIBE ORDERS (OUTPATIENT)
Dept: PHYSICAL THERAPY | Facility: HOSPITAL | Age: 82
End: 2022-11-14

## 2022-11-14 DIAGNOSIS — M54.50 LOW BACK PAIN WITHOUT SCIATICA, UNSPECIFIED BACK PAIN LATERALITY, UNSPECIFIED CHRONICITY: Primary | ICD-10-CM

## 2022-11-15 ENCOUNTER — HOSPITAL ENCOUNTER (OUTPATIENT)
Dept: PHYSICAL THERAPY | Facility: HOSPITAL | Age: 82
Setting detail: THERAPIES SERIES
Discharge: HOME | End: 2022-11-15

## 2022-11-15 DIAGNOSIS — M54.50 CHRONIC LOW BACK PAIN WITHOUT SCIATICA, UNSPECIFIED BACK PAIN LATERALITY: Primary | ICD-10-CM

## 2022-11-15 DIAGNOSIS — G89.29 CHRONIC LOW BACK PAIN WITHOUT SCIATICA, UNSPECIFIED BACK PAIN LATERALITY: Primary | ICD-10-CM

## 2022-11-15 PROCEDURE — 97162 PT EVAL MOD COMPLEX 30 MIN: CPT | Performed by: PHYSICAL THERAPIST

## 2022-11-15 NOTE — THERAPY EVALUATION
Outpatient Physical Therapy Ortho Initial Evaluation  [facility]     Patient Name: Fatemeh Ware  : 1940  MRN: [id number]  Today's Date: 11/15/2022      Visit Date: 11/15/2022    Patient seen for 1 PT sessions.  Patient reports N/A% of improvement.  Next MD appt: 2022.  Recertification: 2022.    Therapy Diagnosis: Chronic LBP         Past Medical History:   Diagnosis Date   • Anxiety    • Arthritis    • Cancer (HCC)    • Elevated cholesterol    • History of transfusion    • Hypertension    • Sleep apnea         Past Surgical History:   Procedure Laterality Date   • APPENDECTOMY     • EYE SURGERY     • HYSTERECTOMY     • JOINT REPLACEMENT     • SKIN BIOPSY     • TONSILLECTOMY         Visit Dx:     ICD-10-CM ICD-9-CM   1. Chronic low back pain without sciatica, unspecified back pain laterality  M54.50 724.2    G89.29 338.29          Patient History     Row Name 11/15/22 1000             History    Chief Complaint Pain  -AJ      Type of Pain Back pain  -AJ      Date Current Problem(s) Began --  Chronic  -AJ      Brief Description of Current Complaint Patient reprts she had an old injury to the back from when she was a sophomore in high school. She reports she went leticia skating and fell so many times on her tailbone that she couldn't get out of bed the next morning. She reprots she never went to the doctor for it due to it getting better. She rpeorts when her  passed away she became a lot more aware of it. She reports if she does a massage once a month she does okay. She reports she thinks it is timw to get it taken care of and resolved.  -AJ      Previous treatment for THIS PROBLEM Massage;Chiropractor  -AJ      Patient/Caregiver Goals Relieve pain;Know what to do to help the symptoms  -AJ      Current Tobacco Use None  -AJ      Smoking Status Former smoker  -AJ      Patient's Rating of General Health Fair  -AJ      Hand Dominance right-handed  -AJ      Occupation/sports/leisure  "activities Occupation: unemployed; Hobbies: sewing, knitting, cross stitch, cooking, travel, taking care of herself  -AJ      Patient seeing anyone else for problem(s)? Yes, [facility] spine [place]  -      What clinical tests have you had for this problem? X-ray  -AJ      History of Previous Related Injuries See above  -AJ         Pain     Pain Location Back  -AJ      Pain at Present 0  -AJ      Pain at Best 0  -AJ      Pain at Worst 6  tension in the shoulders  -AJ      Pain Frequency Intermittent  -AJ      Pain Description Tightness  -AJ      What Performance Factors Make the Current Problem(s) WORSE? \"not really\"  -AJ      What Performance Factors Make the Current Problem(s) BETTER? a massage  -AJ      Is your sleep disturbed? Yes  yes in the past, not recent\"  -AJ      Is medication used to assist with sleep? No  -AJ            User Key  (r) = Recorded By, (t) = Taken By, (c) = Cosigned By    Initials Name Provider Type    AJ Carline Garcias, PT DPT Physical Therapist                 PT Ortho     Row Name 11/15/22 1000       Subjective Comments    Subjective Comments see history  -AJ       Precautions and Contraindications    Precautions CANCER HISTORY  -AJ    Contraindications NO ESTIM/US  -AJ       Subjective Pain    Able to rate subjective pain? yes  -AJ    Pre-Treatment Pain Level 0  -AJ    Post-Treatment Pain Level 0  -AJ       Posture/Observations    Alignment Options Forward head;Cervical lordosis;Thoracic kyphosis;Lumbar lordosis  -AJ    Forward Head Mild;Moderate;Increased  -AJ    Cervical Lordosis Mild;Moderate;Increased  -AJ    Thoracic Kyphosis Mild;Moderate;Increased  -AJ    Lumbar lordosis Mild;Decreased  Flattening  -AJ    Posture/Observations Comments No distress, good overall postural awareness.  -AJ       DTR- Lower Quarter Clearing    Patellar tendon (L2-4) Bilateral:;2- Normal response  -AJ    Achilles tendon (S1-2) Bilateral:;2- Normal response  -AJ       Sensory Screen for Light " Touch- Lower Quarter Clearing    L1 (inguinal area) Bilateral:;Intact  -AJ    L2 (anterior mid thigh) Bilateral:;Intact  -AJ    L3 (distal anterior thigh) Bilateral:;Intact  -AJ    L4 (medial lower leg/foot) Bilateral:;Intact  -AJ    L5 (lateral lower leg/great toe) Bilateral:;Intact  -AJ    S1 (bottom of foot) Right:;Intact  -AJ       Lumbar/SI Special Tests    Trendelenburg Test (Gluteus Medius Weakness) Bilateral:;Negative  -AJ    SLR (Neural Tension) Bilateral:;Negative  -AJ    SI Compression Test (SI Dysfunction) Bilateral:;Negative  -AJ    SI Distraction Test (SI Dysfunction) Bilateral:;Negative  -AJ    JOE (hip vs. SI Dysfunction) Bilateral:;Negative  -AJ    FAIR Test (Piriformis Syndrome) Bilateral:;Negative  -AJ       Mendoza's Signs    Superficial and non-anatomical tenderness Negative  -AJ    Simulation test Negative  -AJ    Distraction straight leg raise test (sitting vs supine) Negative  -AJ    Regional disturbances Negative  -AJ    Overreaction to examination Negative  -AJ       Leg Length Test    Apparent Unequal  R LE short, 0.8cm  -AJ       Head/Neck/Trunk    Trunk Extension AROM 10°  -AJ    Trunk Flexion AROM 90°, fingertips to the toes  -AJ    Trunk Lt Lateral Flexion AROM 50% of range  -AJ    Trunk Rt Lateral Flexion AROM 50% of range  -AJ    Trunk Lt Rotation AROM 75% of range  -AJ    Trunk Rt Rotation AROM 75% of range  -AJ       MMT (Manual Muscle Testing)    General MMT Comments B LE 5/5, except B hip abd 4/5, add R 4+/5, L 4/5, B hp ext 4+/5.  -AJ       Sensation    Sensation WNL? WNL  -AJ    Light Touch No apparent deficits  -AJ    Additional Comments Denies any radicuular pain  -AJ       Lower Extremity Flexibility    Hamstrings Bilateral:;WNL  -AJ    Hip Flexors Bilateral:;Mildly limited  -AJ    LE Other Flexibility Bilateral:;Mildly limited;Moderately limited  piriformis  -AJ       Pathomechanics    Spine Pathomechanics Limited upper thoracic motion with cervical ROM;Excessive thoracic  "kyphosis with forward bend;Bends knees with attempted lumbar extension  -       Transfers    Comment, (Transfers) I with al ltransfers, poor log roll technique.  -AJ       Gait/Stairs (Locomotion)    Cass Level (Gait) independent  -    Pattern (Gait) step-through  -AJ    Deviations/Abnormal Patterns (Gait) festinating/shuffling;gait speed decreased;stride length decreased  -          User Key  (r) = Recorded By, (t) = Taken By, (c) = Cosigned By    Initials Name Provider Type    Carline Day, PT DPT Physical Therapist                            Therapy Education  Education Details: HEP: all exercises given today (Also given c-spine stretches from last bought with PT that patient lost sheets on.)  Given: HEP, Symptoms/condition management, Pain management, Posture/body mechanics, Other (comment) (POC)  Program: New  How Provided: Verbal, Demonstration, Written  Provided to: Patient  Level of Understanding: Teach back education performed, Verbalized, Demonstrated      PT OP Goals     Row Name 11/15/22 1000          PT Short Term Goals    STG 1 I with HEP and have additions/changes by next re-certification  -     STG 2 Patient able to show proper log roll technique.  -     STG 3 Patient to be more aware of posture and posture correction techniques  -     STG 4 AROM for lumbar extension >= 20°.  -     STG 5 AROM B Lumbar SB >= 75% of range.  -     STG 6 AROM B Lumbar ROT WNL.  -     STG 7 Patient to be compliant with heel wedge wear for LLD.  -        Long Term Goals    LTG 1 Patient to have AROM for the lumbar spine all WNL, no increase in pain.  -     LTG 2 B LE 5/5 with no increase in pain.  -     LTG 3 Patient able to perform 10 minutes of standing core stab activities with good PN and no increase in pain.  -     LTG 4 Patient able to perform 20 Bridges with UE \"X\" with no increase in pain.  -     LTG 5 Patient able to show proper lifting technique floor to waist with " no increase in pain.  -     LTG 6 Patient able to show proper ergonomics for mopping/sweeping/vacuuming.  -     LTG 7 I with final HEP.  -AJ        Time Calculation    PT Goal Re-Cert Due Date 12/06/22  -           User Key  (r) = Recorded By, (t) = Taken By, (c) = Cosigned By    Initials Name Provider Type    Carline Day, PT DPT Physical Therapist              Barriers to Rehab: Include significant or possible arthritic/degenerative changes that have occurred within the joints/spine, The chronicity of this issue, The patient's obesity.    Safety Issues: Cancer history, no estim/US        PT Assessment/Plan     Row Name 11/15/22 1000          PT Assessment    Functional Limitations Impaired gait;Limitation in home management;Limitations in community activities;Performance in leisure activities;Performance in self-care ADL  -     Impairments Joint integrity;Posture;Gait;Impaired flexibility;Impaired muscle endurance;Impaired muscle length;Impaired muscle power;Joint mobility;Muscle strength;Pain;Poor body mechanics;Range of motion;Impaired postural alignment  -     Assessment Comments Patient is an 81yo female who presents to the clinic today with ocmplaints of tension i nher neck starting i nher low back going up. She has limitations in flexibility, ROM, and somecore stab/posture/scap stab limitations. Patient's insurance does not allow treatment to begin on the initial evaluation. Small HEP was established.  Skilled PT with address deficits listed.  -AJ     Please refer to paper survey for additional self-reported information Yes  -AJ     Rehab Potential Fair  -AJ     Patient/caregiver participated in establishment of treatment plan and goals Yes  -AJ     Patient would benefit from skilled therapy intervention Yes  -AJ        PT Plan    PT Frequency 2x/week  -AJ     Predicted Duration of Therapy Intervention (PT) 6-10 visits  -AJ     Planned CPT's? PT EVAL MOD COMPLELITY: 04406;PT RE-EVAL:  "17077;PT THER PROC EA 15 MIN: 30850;PT THER ACT EA 15 MIN: 68112;PT MANUAL THERAPY EA 15 MIN: 97326;PT NEUROMUSC RE-EDUCATION EA 15 MIN: 22398;PT GAIT TRAINING EA 15 MIN: 29165;PT SELF CARE/HOME MGMT/TRAIN EA 15: 59862;PT HOT OR COLD PACK TREAT MCARE;PT THER SUPP EA 15 MIN  -AJ     PT Plan Comments Progress overall ROM, strength, core stab, posture, scap stab, ergonomics, and s/s management.  -AJ           User Key  (r) = Recorded By, (t) = Taken By, (c) = Cosigned By    Initials Name Provider Type    Carline Day, PT DPT Physical Therapist            Other therapeutic activities and/or exercises will be prescribed depending on the patient's progress or lack thereof.         OP Exercises     Row Name 11/15/22 1000             Subjective Comments    Subjective Comments see history  -         Subjective Pain    Able to rate subjective pain? yes  -AJ      Pre-Treatment Pain Level 0  -AJ      Post-Treatment Pain Level 0  -AJ         Exercise 1    Exercise Name 1 LTR  -AJ      Reps 1 10  -AJ      Time 1 10\" hold  -AJ         Exercise 2    Exercise Name 2 log roll instruction  -         Exercise 3    Exercise Name 3 B sitting piriformis S  -AJ      Reps 3 1  -AJ      Time 3 30 seconds  -AJ         Exercise 4    Exercise Name 4 fit with heel lift for LLD  -            User Key  (r) = Recorded By, (t) = Taken By, (c) = Cosigned By    Initials Name Provider Type    Carline Day, PT DPT Physical Therapist            All therapeutic interventions performed today were to address current functional limitations and/or deficits in addressing all physical therapy goals.                    Outcome Measure Options: Modified Oswestry  Modified Oswestry  Modified Oswestry Score/Comments: 1/50 = 2%      Time Calculation:     Start Time: 1043  Stop Time: 1124  Time Calculation (min): 41 min     Therapy Charges for Today     Code Description Service Date Service Provider Modifiers Qty    22462109193  PT " EVAL MOD COMPLEXITY 3 11/15/2022 Carline Garcias, PT DPT GP 1    08277989792 HC PT THER SUPP EA 15 MIN 11/15/2022 Carline Garcias PT DPT GP 1          PT G-Codes  Outcome Measure Options: Modified Oswestry  Modified Oswestry Score/Comments: 1/50 = 2%         This document has been electronically signed by Carline Garcias PT DPT, [facility] on November 15, 2022 12:56 CST

## 2022-11-18 ENCOUNTER — HOSPITAL ENCOUNTER (OUTPATIENT)
Dept: PHYSICAL THERAPY | Facility: HOSPITAL | Age: 82
Setting detail: THERAPIES SERIES
Discharge: HOME | End: 2022-11-18

## 2022-11-18 DIAGNOSIS — M54.50 CHRONIC LOW BACK PAIN WITHOUT SCIATICA, UNSPECIFIED BACK PAIN LATERALITY: Primary | ICD-10-CM

## 2022-11-18 DIAGNOSIS — G89.29 CHRONIC LOW BACK PAIN WITHOUT SCIATICA, UNSPECIFIED BACK PAIN LATERALITY: Primary | ICD-10-CM

## 2022-11-18 PROCEDURE — 97110 THERAPEUTIC EXERCISES: CPT | Performed by: PHYSICAL THERAPIST

## 2022-11-18 NOTE — THERAPY TREATMENT NOTE
Outpatient Physical Therapy Ortho Treatment Note  [facility]     Patient Name: Fatemeh Ware  : 1940  MRN: [id number]  Today's Date: 2022      Visit Date: 2022     Patient seen for 2 PT sessions.  Patient reports N/A% of improvement.  Next MD appt: 2022.  Recertification: 2022.     Therapy Diagnosis: Chronic LBP    Visit Dx:    ICD-10-CM ICD-9-CM   1. Chronic low back pain without sciatica, unspecified back pain laterality  M54.50 724.2    G89.29 338.29          Past Medical History:   Diagnosis Date   • Anxiety    • Arthritis    • Cancer (HCC)    • Elevated cholesterol    • History of transfusion    • Hypertension    • Sleep apnea         Past Surgical History:   Procedure Laterality Date   • APPENDECTOMY     • EYE SURGERY     • HYSTERECTOMY     • JOINT REPLACEMENT     • SKIN BIOPSY     • TONSILLECTOMY          PT Ortho     Row Name 22       Precautions and Contraindications    Precautions CANCER HISTORY  -    Contraindications NO ESTIM/US  -          User Key  (r) = Recorded By, (t) = Taken By, (c) = Cosigned By    Initials Name Provider Type    Carline Day, PT DPT Physical Therapist                             PT Assessment/Plan     Row Name 22 09          PT Assessment    Assessment Comments Patient required mild cueing for HEP. Patient did well with all new ther ex.  -        PT Plan    PT Frequency 2x/week  -     PT Plan Comments Continue to progress core stab. Talk about sititng posture.  -           User Key  (r) = Recorded By, (t) = Taken By, (c) = Cosigned By    Initials Name Provider Type    Carline Day, PT DPT Physical Therapist                   OP Exercises     Row Name 22 09             Subjective Comments    Subjective Comments Patient report the heel lift has really helped. She reports that she has been out walking a lot and not really doingher HEP.  -AALIYAH         Subjective Pain    Able to rate  "subjective pain? yes  -AJ      Pre-Treatment Pain Level 0  -AJ         Exercise 1    Exercise Name 1 Pro II LE- strengthening/endurance  -AJ      Time 1 10 min  -AJ      Additional Comments L 4.0  -AJ         Exercise 2    Exercise Name 2 B St. HS S  -AJ      Reps 2 2  -AJ      Time 2 30 seconds  -AJ         Exercise 3    Exercise Name 3 B sitting piriformis S  -AJ      Reps 3 2  -AJ      Time 3 30 seconds  -AJ      Additional Comments press knee down  -AJ         Exercise 4    Exercise Name 4 Sit to/from stand with lumbar extensions  -AJ      Sets 4 1  -AJ      Reps 4 10  -AJ      Time 4 5\" hold  -AJ      Additional Comments Cues for eccentric control and = WB  -AJ         Exercise 5    Exercise Name 5 Bridges  -AJ      Sets 5 2  -AJ      Reps 5 10  -AJ      Time 5 5\" hold  -AJ         Exercise 6    Exercise Name 6 HL B hip add  -AJ      Time 6 5\" hold for 3 min  -AJ         Exercise 7    Exercise Name 7 HL B Hip abd  -AJ      Time 7 5\" holds for 3 min  -AJ      Additional Comments RTB  -AJ         Exercise 8    Exercise Name 8 HLM  -AJ      Time 8 5\" holds for 3 min  -AJ      Additional Comments RTB  -AJ         Exercise 9    Exercise Name 9 LTR  -AJ      Reps 9 10  -AJ      Time 9 10\" hold  -AJ         Exercise 10    Exercise Name 10 log roll review  -AJ            User Key  (r) = Recorded By, (t) = Taken By, (c) = Cosigned By    Initials Name Provider Type    Carline Day, PT DPT Physical Therapist            All therapeutic interventions performed today were to address current functional limitations and/or deficits in addressing all physical therapy goals.                    PT OP Goals     Row Name 11/18/22 0900          PT Short Term Goals    STG 1 I with HEP and have additions/changes by next re-certification  -AJ     STG 1 Progress Partially Met;Ongoing;Progressing  -AJ     STG 2 Patient able to show proper log roll technique.  -AJ     STG 2 Progress Ongoing;Progressing  -AJ     STG 3 Patient to " "be more aware of posture and posture correction techniques  -     STG 4 AROM for lumbar extension >= 20°.  -     STG 5 AROM B Lumbar SB >= 75% of range.  -     STG 6 AROM B Lumbar ROT WNL.  -     STG 7 Patient to be compliant with heel wedge wear for LLD.  -        Long Term Goals    LTG 1 Patient to have AROM for the lumbar spine all WNL, no increase in pain.  -     LTG 2 B LE 5/5 with no increase in pain.  -     LTG 3 Patient able to perform 10 minutes of standing core stab activities with good PN and no increase in pain.  -     LTG 4 Patient able to perform 20 Bridges with UE \"X\" with no increase in pain.  -     LTG 5 Patient able to show proper lifting technique floor to waist with no increase in pain.  -     LTG 6 Patient able to show proper ergonomics for mopping/sweeping/vacuuming.  -     LTG 7 I with final HEP.  -        Time Calculation    PT Goal Re-Cert Due Date 12/06/22  -           User Key  (r) = Recorded By, (t) = Taken By, (c) = Cosigned By    Initials Name Provider Type    Carline Day, PT DPT Physical Therapist                Therapy Education  Education Details: HEP: Add HS S, Bridges, and HLM  Given: HEP, Symptoms/condition management, Pain management, Posture/body mechanics, Other (comment) (POC)  Program: New, Reinforced  How Provided: Verbal, Demonstration, Written  Provided to: Patient  Level of Understanding: Teach back education performed, Verbalized, Demonstrated              Time Calculation:   Start Time: 0920  Stop Time: 1016  Time Calculation (min): 56 min  Total Timed Code Minutes- PT: 56 minute(s)  Therapy Charges for Today     Code Description Service Date Service Provider Modifiers Qty    87833152088 HC PT THER SUPP EA 15 MIN 11/18/2022 Carline Garcias, PT DPT GP 1    13182386079 HC PT THER PROC EA 15 MIN 11/18/2022 Carline Garcias, ADAM DPT GP 4                  This document has been electronically signed by Carline Garcias PT " SONNY, CSCS on November 18, 2022 10:21 CST

## 2022-11-22 ENCOUNTER — HOSPITAL ENCOUNTER (OUTPATIENT)
Dept: PHYSICIAL THERAPY | Facility: HOSPITAL | Age: 82
Setting detail: THERAPIES SERIES
Discharge: HOME | End: 2022-11-22

## 2022-11-22 DIAGNOSIS — M54.50 CHRONIC LOW BACK PAIN WITHOUT SCIATICA, UNSPECIFIED BACK PAIN LATERALITY: Primary | ICD-10-CM

## 2022-11-22 DIAGNOSIS — G89.29 CHRONIC LOW BACK PAIN WITHOUT SCIATICA, UNSPECIFIED BACK PAIN LATERALITY: Primary | ICD-10-CM

## 2022-11-22 PROCEDURE — 97110 THERAPEUTIC EXERCISES: CPT

## 2022-11-22 NOTE — THERAPY TREATMENT NOTE
Outpatient Physical Therapy Ortho Treatment Note  [facility]     Patient Name: Fatemeh Ware  : 1940  MRN: [id number]  Today's Date: 2022      Visit Date: 2022    Patient seen for 3 PT sessions.  Patient reports N/A% of improvement.  Next MD appt: 12/15/2022.  Recertification: 2022.     Therapy Diagnosis: Chronic LBP    Visit Dx:    ICD-10-CM ICD-9-CM   1. Chronic low back pain without sciatica, unspecified back pain laterality  M54.50 724.2    G89.29 338.29       There is no problem list on file for this patient.       Past Medical History:   Diagnosis Date   • Anxiety    • Arthritis    • Cancer (HCC)    • Elevated cholesterol    • History of transfusion    • Hypertension    • Sleep apnea         Past Surgical History:   Procedure Laterality Date   • APPENDECTOMY     • EYE SURGERY     • HYSTERECTOMY     • JOINT REPLACEMENT     • SKIN BIOPSY     • TONSILLECTOMY          PT Ortho     Row Name 22 1300       Precautions and Contraindications    Precautions CANCER HISTORY  -KB    Contraindications NO ESTIM/US  -KB          User Key  (r) = Recorded By, (t) = Taken By, (c) = Cosigned By    Initials Name Provider Type    Catrina Adorno, PT Physical Therapist                             PT Assessment/Plan     Row Name 22 1300          PT Assessment    Assessment Comments Patient continuing to tolerate thereapeutic exercise well. Focused on general strengthening and mobility this date. Increased resistance to GTB this date with pt not complaining of increase in symptoms. Increase in reps and progress exercises next date if todays session does not aggrevate symptoms.  -KB        PT Plan    PT Frequency 2x/week  -KB     PT Plan Comments Review sitting posture. Progress ther ex  -KB           User Key  (r) = Recorded By, (t) = Taken By, (c) = Cosigned By    Initials Name Provider Type    Catrina Adorno, PT Physical Therapist                   OP Exercises     Row Name  "11/22/22 1300             Subjective Comments    Subjective Comments Patient reports that she is doing well today. Reports she is not having any pain and that she took 2 ibuprofen this morning which usually helps to control/eliminate pain. Reports she is doing more walking than specific HEP exercises. Reports her next MD visit is on the 12/15/2022. No new complaints / reports at this time.  -KB         Subjective Pain    Able to rate subjective pain? yes  -KB      Pre-Treatment Pain Level 0  -KB         Exercise 1    Exercise Name 1 Pro II LE- strengthening/endurance  -KB      Time 1 10 min  -KB      Additional Comments L 4.0  -KB         Exercise 2    Exercise Name 2 B St. HS S  -KB      Reps 2 2  -KB      Time 2 30 seconds  -KB         Exercise 3    Exercise Name 3 B sitting piriformis S  -KB      Reps 3 2  -KB      Time 3 30 seconds  -KB      Additional Comments press knee down  -KB         Exercise 4    Exercise Name 4 Sit to/from stand with lumbar extensions  -KB      Sets 4 1  -KB      Reps 4 10  -KB      Time 4 5\" hold  -KB      Additional Comments Cues for eccentric control and = WB  -KB         Exercise 5    Exercise Name 5 Bridges  -KB      Sets 5 2  -KB      Reps 5 10  -KB      Time 5 5\" hold  -KB         Exercise 6    Exercise Name 6 HL B hip add  -KB      Time 6 5\" hold for 3 min  -KB         Exercise 7    Exercise Name 7 HL B Hip abd  -KB      Time 7 5\" holds for 3 min  -KB      Additional Comments GTB  -KB         Exercise 8    Exercise Name 8 HLM  -KB      Time 8 5\" holds for 3 min  -KB      Additional Comments GTB  -KB         Exercise 9    Exercise Name 9 LTR  -KB      Reps 9 10  -KB      Time 9 10\" hold  -KB         Exercise 10    Exercise Name 10 Log roll / sitting posture review  -KB      Cueing 10 Verbal  -KB            User Key  (r) = Recorded By, (t) = Taken By, (c) = Cosigned By    Initials Name Provider Type    KB Catrina Clinton PT Physical Therapist                              PT OP " "Goals     Row Name 11/22/22 1350 11/22/22 1300       PT Short Term Goals    STG 1 -- I with HEP and have additions/changes by next re-certification  -    STG 1 Progress -- Partially Met;Ongoing;Progressing  -    STG 2 -- Patient able to show proper log roll technique.  -    STG 2 Progress -- Ongoing;Progressing  -    STG 3 -- Patient to be more aware of posture and posture correction techniques  -    STG 4 -- AROM for lumbar extension >= 20°.  -    STG 5 -- AROM B Lumbar SB >= 75% of range.  -    STG 6 -- AROM B Lumbar ROT WNL.  -    STG 7 -- Patient to be compliant with heel wedge wear for LLD.  -       Long Term Goals    LTG 1 -- Patient to have AROM for the lumbar spine all WNL, no increase in pain.  -    LTG 2 -- B LE 5/5 with no increase in pain.  -    LTG 3 -- Patient able to perform 10 minutes of standing core stab activities with good PN and no increase in pain.  -    LTG 4 -- Patient able to perform 20 Bridges with UE \"X\" with no increase in pain.  -    LTG 5 -- Patient able to show proper lifting technique floor to waist with no increase in pain.  -    LTG 6 -- Patient able to show proper ergonomics for mopping/sweeping/vacuuming.  -    LTG 7 -- I with final HEP.  -       Time Calculation    PT Goal Re-Cert Due Date 12/06/22  -KB 12/06/22  -          User Key  (r) = Recorded By, (t) = Taken By, (c) = Cosigned By    Initials Name Provider Type    Catrina Adorno, PT Physical Therapist                               Time Calculation:   Start Time: 1300  Stop Time: 1346  Time Calculation (min): 46 min  Total Timed Code Minutes- PT: 46 minute(s)  Therapy Charges for Today     Code Description Service Date Service Provider Modifiers Qty    98976247249 HC PT THER PROC EA 15 MIN 11/22/2022 Catrina Clinton, PT GP 3    [id number] HC PT THER SUPP EA 15 MIN 11/22/2022 Catrina Clinton, PT GP 1                    Catrina Clinton PT  11/22/2022     "

## 2022-11-29 ENCOUNTER — HOSPITAL ENCOUNTER (OUTPATIENT)
Dept: PHYSICAL THERAPY | Facility: HOSPITAL | Age: 82
Setting detail: THERAPIES SERIES
Discharge: HOME | End: 2022-11-29

## 2022-11-29 DIAGNOSIS — G89.29 CHRONIC LOW BACK PAIN WITHOUT SCIATICA, UNSPECIFIED BACK PAIN LATERALITY: Primary | ICD-10-CM

## 2022-11-29 DIAGNOSIS — M54.50 CHRONIC LOW BACK PAIN WITHOUT SCIATICA, UNSPECIFIED BACK PAIN LATERALITY: Primary | ICD-10-CM

## 2022-11-29 PROCEDURE — 97110 THERAPEUTIC EXERCISES: CPT

## 2022-11-29 NOTE — THERAPY TREATMENT NOTE
"    Outpatient Physical Therapy Ortho Treatment Note  [facility]     Patient Name: Fatemeh Ware  : 1940  MRN: [id number]  Today's Date: 2022     Pt seen for 4 PT sessions  Reported Improvement:  \"a lot better\" %  MD Visit: 12/15/2022  Recheck Date: 2022    Therapy Diagnosis:  Chronic LBP       Visit Date: 2022    Visit Dx:    ICD-10-CM ICD-9-CM   1. Chronic low back pain without sciatica, unspecified back pain laterality  M54.50 724.2    G89.29 338.29       There is no problem list on file for this patient.       Past Medical History:   Diagnosis Date   • Anxiety    • Arthritis    • Cancer (HCC)    • Elevated cholesterol    • History of transfusion    • Hypertension    • Sleep apnea         Past Surgical History:   Procedure Laterality Date   • APPENDECTOMY     • EYE SURGERY     • HYSTERECTOMY     • JOINT REPLACEMENT     • SKIN BIOPSY     • TONSILLECTOMY          PT Ortho     Row Name 22 09       Subjective Comments    Subjective Comments Pain is mostly in hip.  goes away with ibuprofen but would like to not have to take pills everyday for pain.  -       Precautions and Contraindications    Precautions CANCER HISTORY  -    Contraindications NO ESTIM/US  -JW       Subjective Pain    Able to rate subjective pain? yes  -    Pre-Treatment Pain Level 0  -          User Key  (r) = Recorded By, (t) = Taken By, (c) = Cosigned By    Initials Name Provider Type    Hamida Duran, PRAVEEN Physical Therapist Assistant                             PT Assessment/Plan     Row Name 22 09          PT Assessment    Assessment Comments Pt with good effort with all therex and stretches.  HS stretch modified to sitting for improved compliance at home. Pt has f/u with PCP in one week and was agreeable to scheduling 2 more visits until appointment.  No pain post tx session.  -ASHLIE        PT Plan    PT Frequency 2x/week  -     PT Plan Comments Next visit add seated TrA marching/LAQ  -JW  " "         User Key  (r) = Recorded By, (t) = Taken By, (c) = Cosigned By    Initials Name Provider Type    Hamida Duran PRAVEEN Physical Therapist Assistant                 Modalities     Row Name 11/29/22 0900             Subjective Pain    Post-Treatment Pain Level 0  -JW            User Key  (r) = Recorded By, (t) = Taken By, (c) = Cosigned By    Initials Name Provider Type    Hamida Duran PRAVEEN Physical Therapist Assistant               OP Exercises     Row Name 11/29/22 0900             Subjective Comments    Subjective Comments Pain is mostly in hip.  goes away with ibuprofen but would like to not have to take pills everyday for pain.  -JW         Subjective Pain    Able to rate subjective pain? yes  -JW      Pre-Treatment Pain Level 0  -JW      Post-Treatment Pain Level 0  -JW         Exercise 1    Exercise Name 1 Pro II LE- strengthening/endurance  -JW      Time 1 10 min  -JW      Additional Comments L 4.5  -JW         Exercise 2    Exercise Name 2 B seated HS st  -JW      Reps 2 2  -JW      Time 2 30  -JW         Exercise 3    Exercise Name 3 B sitting piriformis S  -JW      Reps 3 2  -JW      Time 3 30  -JW      Additional Comments knee press down  -JW         Exercise 4    Exercise Name 4 Sit to/from stand with lumbar extensions  -JW      Sets 4 2  -JW      Reps 4 10  -JW      Time 4 5\" hold in extension  -JW         Exercise 5    Exercise Name 5 LTR  -JW      Reps 5 10  -JW      Time 5 10  -JW         Exercise 6    Exercise Name 6 Bridges  -JW      Sets 6 2  -JW      Reps 6 10  -JW      Time 6 5\" hold  -JW         Exercise 7    Exercise Name 7 HL Hip ADD  -JW      Reps 7 20  -JW      Time 7 5\" hold  -JW         Exercise 8    Exercise Name 8 HL tband Hip ABD  -JW      Reps 8 20  -JW      Additional Comments GTB  -JW         Exercise 9    Exercise Name 9 HLM  -JW      Time 9 3 min alt LE's  -JW         Exercise 10    Exercise Name 10 Log roll training  -JW            User Key  (r) = Recorded By, (t) = " "Taken By, (c) = Cosigned By    Initials Name Provider Type    Hamida Duran PTA Physical Therapist Assistant                              PT OP Goals     Row Name 11/29/22 0900          PT Short Term Goals    STG 1 I with HEP and have additions/changes by next re-certification  -     STG 1 Progress Partially Met;Ongoing;Progressing  -     STG 2 Patient able to show proper log roll technique.  -     STG 2 Progress Ongoing;Progressing  -     STG 3 Patient to be more aware of posture and posture correction techniques  -     STG 3 Progress Ongoing;Progressing  -     STG 4 AROM for lumbar extension >= 20°.  -     STG 5 AROM B Lumbar SB >= 75% of range.  -     STG 6 AROM B Lumbar ROT WNL.  -     STG 7 Patient to be compliant with heel wedge wear for LLD.  -        Long Term Goals    LTG 1 Patient to have AROM for the lumbar spine all WNL, no increase in pain.  -     LTG 2 B LE 5/5 with no increase in pain.  -     LTG 3 Patient able to perform 10 minutes of standing core stab activities with good PN and no increase in pain.  -     LTG 4 Patient able to perform 20 Bridges with UE \"X\" with no increase in pain.  -     LTG 5 Patient able to show proper lifting technique floor to waist with no increase in pain.  -     LTG 6 Patient able to show proper ergonomics for mopping/sweeping/vacuuming.  -     LTG 7 I with final HEP.  -        Time Calculation    PT Goal Re-Cert Due Date 12/06/22  -           User Key  (r) = Recorded By, (t) = Taken By, (c) = Cosigned By    Initials Name Provider Type    Hamida Duran PTA Physical Therapist Assistant                Therapy Education  Education Details: issued green and for HEP  Given: Symptoms/condition management, Posture/body mechanics  Program: Reinforced, Modified  How Provided: Verbal, Demonstration  Provided to: Patient  Level of Understanding: Verbalized              Time Calculation:   Start Time: 0915  Stop Time: 1012  Time " Calculation (min): 57 min  Therapy Charges for Today     Code Description Service Date Service Provider Modifiers Qty    48066640900 HC PT THER PROC EA 15 MIN 11/29/2022 Hamida Falk PTA GP, CQ 4    25622986451 HC PT THER SUPP EA 15 MIN 11/29/2022 Hamida Falk PTA GP, CQ 1                    Hamida Falk PTA  11/29/2022

## 2022-12-02 ENCOUNTER — APPOINTMENT (OUTPATIENT)
Dept: PHYSICAL THERAPY | Facility: HOSPITAL | Age: 82
End: 2022-12-02
Payer: COMMERCIAL

## 2022-12-07 ENCOUNTER — APPOINTMENT (OUTPATIENT)
Dept: PHYSICAL THERAPY | Facility: HOSPITAL | Age: 82
End: 2022-12-07
Payer: COMMERCIAL

## 2022-12-08 ENCOUNTER — APPOINTMENT (OUTPATIENT)
Dept: PHYSICAL THERAPY | Facility: HOSPITAL | Age: 82
End: 2022-12-08
Payer: COMMERCIAL

## 2022-12-14 ENCOUNTER — HOSPITAL ENCOUNTER (OUTPATIENT)
Dept: PHYSICAL THERAPY | Facility: HOSPITAL | Age: 82
Setting detail: THERAPIES SERIES
Discharge: HOME | End: 2022-12-14
Payer: COMMERCIAL

## 2022-12-14 DIAGNOSIS — G89.29 CHRONIC LOW BACK PAIN WITHOUT SCIATICA, UNSPECIFIED BACK PAIN LATERALITY: Primary | ICD-10-CM

## 2022-12-14 DIAGNOSIS — M54.50 CHRONIC LOW BACK PAIN WITHOUT SCIATICA, UNSPECIFIED BACK PAIN LATERALITY: Primary | ICD-10-CM

## 2022-12-14 PROCEDURE — 97110 THERAPEUTIC EXERCISES: CPT | Performed by: PHYSICAL THERAPIST

## 2022-12-14 PROCEDURE — 97530 THERAPEUTIC ACTIVITIES: CPT | Performed by: PHYSICAL THERAPIST

## 2022-12-14 NOTE — THERAPY DISCHARGE NOTE
"     Outpatient Physical Therapy Ortho Progress Note/Discharge Summary  [facility]     Patient Name: Fatemeh Ware  : 1940  MRN: [id number]  Today's Date: 2022      Visit Date: 2022     Patient seen for 5 PT sessions.  Patient reports \"quite a bit\"% of improvement.  Next MD appt: 12/15/2023.  Recertification: N/A.    Therapy Diagnosis: Chronic LBP/LLD        Visit Dx:    ICD-10-CM ICD-9-CM   1. Chronic low back pain without sciatica, unspecified back pain laterality  M54.50 724.2    G89.29 338.29            Past Medical History:   Diagnosis Date   • Anxiety    • Arthritis    • Cancer (HCC)    • Elevated cholesterol    • History of transfusion    • Hypertension    • Sleep apnea         Past Surgical History:   Procedure Laterality Date   • APPENDECTOMY     • EYE SURGERY     • HYSTERECTOMY     • JOINT REPLACEMENT     • SKIN BIOPSY     • TONSILLECTOMY          PT Ortho     Row Name 22 1300       Subjective Comments    Subjective Comments Patient reports she has had COVID so she has just been fatigued. She reports that she is fine as long as she take 2 ibuprofen in the morning.  -AJ       Precautions and Contraindications    Precautions CANCER HISTORY  -    Contraindications NO ESTIM/US  -       Subjective Pain    Able to rate subjective pain? yes  -    Pre-Treatment Pain Level 0  -       Posture/Observations    Alignment Options Forward head;Cervical lordosis;Thoracic kyphosis;Lumbar lordosis  -AJ    Forward Head Mild;Moderate;Increased  -AJ    Cervical Lordosis Mild;Moderate;Increased  -AJ    Thoracic Kyphosis Mild;Moderate;Increased  -AJ    Lumbar lordosis Mild;Decreased  flattening  -    Posture/Observations Comments No distress, good overall postural awareness.  -AJ       Leg Length Test    Apparent Unequal  Patient wears heel wedge  -AJ       Head/Neck/Trunk    Trunk Extension AROM 20°  -AJ    Trunk Flexion AROM 90°, fingertips to the toes  -AJ    Trunk Lt Lateral Flexion " AROM 100% of range, WNL  -AJ    Trunk Rt Lateral Flexion AROM 100% of range, WNL  -AJ    Trunk Lt Rotation AROM 100% of range, WNL  -AJ    Trunk Rt Rotation AROM 100% of range, WNL  -AJ       MMT (Manual Muscle Testing)    General MMT Comments B LE 5/5.  -AJ       Sensation    Sensation WNL? WNL  -AJ    Light Touch No apparent deficits  -AJ       Pathomechanics    Spine Pathomechanics Limited upper thoracic motion with cervical ROM;Excessive thoracic kyphosis with forward bend;Bends knees with attempted lumbar extension  -AJ       Transfers    Comment, (Transfers) I with al ltransfers with mild cueing for proper log roll technique.  -AJ       Gait/Stairs (Locomotion)    [place] Level (Gait) independent  -AJ    Pattern (Gait) step-through  -AJ    Deviations/Abnormal Patterns (Gait) festinating/shuffling;gait speed decreased;stride length decreased  -AJ          User Key  (r) = Recorded By, (t) = Taken By, (c) = Cosigned By    Initials Name Provider Type    AJ Carline Garcias, PT DPT Physical Therapist               Barriers to Rehab: Include significant or possible arthritic/degenerative changes that have occurred within the joints/spine, The chronicity of this issue, The patient's obesity.     Safety Issues: Cancer history, no estim/US                PT Assessment/Plan     Row Name 12/14/22 1300          PT Assessment    Functional Limitations --  None reported by patient  -AJ     Impairments Joint integrity;Impaired muscle endurance;Impaired postural alignment  -AJ     Assessment Comments Patient has met almost all goals. SOme cueing for proper log roll still supine ot sit but not sit to supine. Patient able to show good overall ergonomics once instructed for lifting and household chores.  -AJ     Please refer to paper survey for additional self-reported information Yes  -AJ     Rehab Potential Fair  -AJ     Patient/caregiver participated in establishment of treatment plan and goals Yes  -AJ     Patient  "would benefit from skilled therapy intervention No  -AJ        PT Plan    PT Frequency --  N/A  -     PT Plan Comments D/C today with a final HEP.  -           User Key  (r) = Recorded By, (t) = Taken By, (c) = Cosigned By    Initials Name Provider Type    Carline Day, PT DPT Physical Therapist                     OP Exercises     Row Name 12/14/22 1300             Subjective Comments    Subjective Comments Patient reports she has had COVID so she has just been fatigued. She reports that she is fine as long as she take 2 ibuprofen in the morning.  -         Subjective Pain    Able to rate subjective pain? yes  -      Pre-Treatment Pain Level 0  -         Exercise 1    Exercise Name 1 Pro II LE- strengthening/endurance  -      Time 1 10 min  -      Additional Comments L 5.0  -         Exercise 2    Exercise Name 2 B St. HS st  -AJ      Reps 2 2  -AJ      Additional Comments 30 seconds  -         Exercise 3    Exercise Name 3 B sitting piriformis S  -      Reps 3 2  -AJ      Time 3 30 seconds  -AJ      Additional Comments press knee down  -         Exercise 4    Exercise Name 4 measurements  -         Exercise 5    Exercise Name 5 Bridges with UE \"X\"  -      Reps 5 20  -AJ      Time 5 5\" hold  -         Exercise 6    Exercise Name 6 Household ergonomics  -         Exercise 7    Exercise Name 7 Lifting techniques/ergonomics  -         Exercise 8    Exercise Name 8 Discussion of POC and final HEP.  -            User Key  (r) = Recorded By, (t) = Taken By, (c) = Cosigned By    Initials Name Provider Type    Carline Day, PT DPT Physical Therapist            All therapeutic interventions performed today were to address current functional limitations and/or deficits in addressing all physical therapy goals.                      PT OP Goals     Row Name 12/14/22 1300          PT Short Term Goals    STG 1 I with HEP and have additions/changes by next re-certification " " -     STG 1 Progress Met  -     STG 2 Patient able to show proper log roll technique.  -     STG 2 Progress Partially Met  -     STG 3 Patient to be more aware of posture and posture correction techniques  -     STG 3 Progress Partially Met  -     STG 4 AROM for lumbar extension >= 20°.  -     STG 4 Progress Met  -     STG 5 AROM B Lumbar SB >= 75% of range.  -     STG 5 Progress Met  -     STG 6 AROM B Lumbar ROT WNL.  -     STG 6 Progress Met  -     STG 7 Patient to be compliant with heel wedge wear for LLD.  -     STG 7 Progress Met  -        Long Term Goals    LTG 1 Patient to have AROM for the lumbar spine all WNL, no increase in pain.  -     LTG 1 Progress Met  -     LTG 2 B LE 5/5 with no increase in pain.  -     LTG 2 Progress Met  -     LTG 3 Patient able to perform 10 minutes of standing core stab activities with good PN and no increase in pain.  -     LTG 3 Progress --  Not assessed  -     LTG 4 Patient able to perform 20 Bridges with UE \"X\" with no increase in pain.  -     LTG 4 Progress Met  -     LTG 5 Patient able to show proper lifting technique floor to waist with no increase in pain.  -     LTG 5 Progress Met  -     LTG 6 Patient able to show proper ergonomics for mopping/sweeping/vacuuming.  -     LTG 6 Progress Met  -     LTG 7 I with final HEP.  -     LTG 7 Progress Met  -        Time Calculation    PT Goal Re-Cert Due Date --  N/A  -           User Key  (r) = Recorded By, (t) = Taken By, (c) = Cosigned By    Initials Name Provider Type    Carline Day, PT DPT Physical Therapist                     Outcome Measure Options: Modified Oswestry  Modified Oswestry  Modified Oswestry Score/Comments: 0/50 = 0%      Time Calculation:   Start Time: 1300  Stop Time: 1342  Time Calculation (min): 42 min  Total Timed Code Minutes- PT: 42 minute(s)  Therapy Charges for Today     Code Description Service Date Service Provider Modifiers Qty "    42932800992  PT THER SUPP EA 15 MIN 12/14/2022 Carline Garcias, PT DPT GP 1    24671497894 HC PT THERAPEUTIC ACT EA 15 MIN 12/14/2022 Carline Garcias, PT DPT GP 1    [id number]  PT THER PROC EA 15 MIN 12/14/2022 Carline Garcias, PT DPT GP 2          PT G-Codes  Outcome Measure Options: Modified Oswestry  Modified Oswestry Score/Comments: 0/50 = 0%     OP PT Discharge Summary  Date of Discharge: 12/14/22  Reason for Discharge: Independent, Patient/Caregiver request  Outcomes Achieved: Patient able to partially acheive established goals, Refer to plan of care for updates on goals achieved  Discharge Destination: Home with home program      This document has been electronically signed by Carline Garcias PT DPT, [place] on December 14, 2022 13:55 CST